# Patient Record
Sex: FEMALE | Race: WHITE
[De-identification: names, ages, dates, MRNs, and addresses within clinical notes are randomized per-mention and may not be internally consistent; named-entity substitution may affect disease eponyms.]

---

## 2020-03-29 ENCOUNTER — HOSPITAL ENCOUNTER (OUTPATIENT)
Dept: HOSPITAL 25 - ED | Age: 25
Setting detail: OBSERVATION
LOS: 1 days | Discharge: HOME | End: 2020-03-30
Attending: SURGERY | Admitting: HOSPITALIST
Payer: COMMERCIAL

## 2020-03-29 DIAGNOSIS — Z80.51: ICD-10-CM

## 2020-03-29 DIAGNOSIS — K21.9: ICD-10-CM

## 2020-03-29 DIAGNOSIS — R10.11: ICD-10-CM

## 2020-03-29 DIAGNOSIS — K80.00: Primary | ICD-10-CM

## 2020-03-29 DIAGNOSIS — R11.2: ICD-10-CM

## 2020-03-29 DIAGNOSIS — Z88.0: ICD-10-CM

## 2020-03-29 LAB
ALBUMIN SERPL BCG-MCNC: 4 G/DL (ref 3.2–5.2)
ALBUMIN/GLOB SERPL: 1.3 {RATIO} (ref 1–3)
ALP SERPL-CCNC: 61 U/L (ref 34–104)
ALT SERPL W P-5'-P-CCNC: 10 U/L (ref 7–52)
AMYLASE SERPL-CCNC: 32 U/L (ref 29–103)
ANION GAP SERPL CALC-SCNC: 5 MMOL/L (ref 2–11)
AST SERPL-CCNC: 11 U/L (ref 13–39)
BASOPHILS # BLD AUTO: 0 10^3/UL (ref 0–0.2)
BUN SERPL-MCNC: 8 MG/DL (ref 6–24)
BUN/CREAT SERPL: 11.4 (ref 8–20)
CALCIUM SERPL-MCNC: 8.8 MG/DL (ref 8.6–10.3)
CHLORIDE SERPL-SCNC: 105 MMOL/L (ref 101–111)
EOSINOPHIL # BLD AUTO: 0.1 10^3/UL (ref 0–0.6)
GLOBULIN SER CALC-MCNC: 3.1 G/DL (ref 2–4)
GLUCOSE SERPL-MCNC: 102 MG/DL (ref 70–100)
HCG SERPL QL: < 0.6 MIU/ML
HCO3 SERPL-SCNC: 26 MMOL/L (ref 22–32)
HCT VFR BLD AUTO: 40 % (ref 35–47)
HGB BLD-MCNC: 13.4 G/DL (ref 12–16)
INR PPP/BLD: 1.17 (ref 0.82–1.09)
LYMPHOCYTES # BLD AUTO: 1.1 10^3/UL (ref 1–4.8)
MCH RBC QN AUTO: 30 PG (ref 27–31)
MCHC RBC AUTO-ENTMCNC: 34 G/DL (ref 31–36)
MCV RBC AUTO: 88 FL (ref 80–97)
MONOCYTES # BLD AUTO: 0.7 10^3/UL (ref 0–0.8)
NEUTROPHILS # BLD AUTO: 6.5 10^3/UL (ref 1.5–7.7)
NRBC # BLD AUTO: 0 10^3/UL
NRBC BLD QL AUTO: 0
PLATELET # BLD AUTO: 225 10^3/UL (ref 150–450)
POTASSIUM SERPL-SCNC: 3.7 MMOL/L (ref 3.5–5)
PROT SERPL-MCNC: 7.1 G/DL (ref 6.4–8.9)
RBC # BLD AUTO: 4.51 10^6 /UL (ref 3.7–4.87)
RBC UR QL AUTO: (no result)
SODIUM SERPL-SCNC: 136 MMOL/L (ref 135–145)
WBC # BLD AUTO: 8.4 10^3/UL (ref 3.5–10.8)
WBC UR QL AUTO: (no result)

## 2020-03-29 PROCEDURE — 81025 URINE PREGNANCY TEST: CPT

## 2020-03-29 PROCEDURE — 83690 ASSAY OF LIPASE: CPT

## 2020-03-29 PROCEDURE — 83605 ASSAY OF LACTIC ACID: CPT

## 2020-03-29 PROCEDURE — 80053 COMPREHEN METABOLIC PANEL: CPT

## 2020-03-29 PROCEDURE — 36415 COLL VENOUS BLD VENIPUNCTURE: CPT

## 2020-03-29 PROCEDURE — 96365 THER/PROPH/DIAG IV INF INIT: CPT

## 2020-03-29 PROCEDURE — 96366 THER/PROPH/DIAG IV INF ADDON: CPT

## 2020-03-29 PROCEDURE — 81003 URINALYSIS AUTO W/O SCOPE: CPT

## 2020-03-29 PROCEDURE — G0378 HOSPITAL OBSERVATION PER HR: HCPCS

## 2020-03-29 PROCEDURE — 87086 URINE CULTURE/COLONY COUNT: CPT

## 2020-03-29 PROCEDURE — 81015 MICROSCOPIC EXAM OF URINE: CPT

## 2020-03-29 PROCEDURE — 88304 TISSUE EXAM BY PATHOLOGIST: CPT

## 2020-03-29 PROCEDURE — 96375 TX/PRO/DX INJ NEW DRUG ADDON: CPT

## 2020-03-29 PROCEDURE — 76705 ECHO EXAM OF ABDOMEN: CPT

## 2020-03-29 PROCEDURE — 96361 HYDRATE IV INFUSION ADD-ON: CPT

## 2020-03-29 PROCEDURE — 85025 COMPLETE CBC W/AUTO DIFF WBC: CPT

## 2020-03-29 PROCEDURE — 85610 PROTHROMBIN TIME: CPT

## 2020-03-29 PROCEDURE — 99284 EMERGENCY DEPT VISIT MOD MDM: CPT

## 2020-03-29 PROCEDURE — 86140 C-REACTIVE PROTEIN: CPT

## 2020-03-29 PROCEDURE — 82150 ASSAY OF AMYLASE: CPT

## 2020-03-29 PROCEDURE — 84702 CHORIONIC GONADOTROPIN TEST: CPT

## 2020-03-29 RX ADMIN — METRONIDAZOLE SCH MLS/HR: 5 INJECTION, SOLUTION INTRAVENOUS at 20:49

## 2020-03-29 RX ADMIN — CIPROFLOXACIN SCH MLS/HR: 2 INJECTION, SOLUTION INTRAVENOUS at 23:37

## 2020-03-29 RX ADMIN — METRONIDAZOLE SCH MLS/HR: 5 INJECTION, SOLUTION INTRAVENOUS at 13:26

## 2020-03-29 RX ADMIN — CIPROFLOXACIN SCH MLS/HR: 2 INJECTION, SOLUTION INTRAVENOUS at 12:01

## 2020-03-29 NOTE — ED
Abdominal Pain/Female





- HPI Summary


HPI Summary: 


Patient is a 24 year-old female presenting to [facility] with a chief complaint of 

RUQ pain initially onset 11 months ago and worsening over the last few days. 

She reports she began having gallbladder issues after her son was born almost a 

year ago. Over the course of her pain, she was diagnosed with anxiety attacks 

and GERD, which she has been placed on medications for without relief of her 

pain. After it was found that she has gallbladder issues, she has continued to 

have worsening attacks. She was initially scheduled to have a cholecystectomy 

on 3/24/2020, but the surgery was canceled due to the pandemic. On 3/27/2020, 

she went to [facility] and had an ultrasound but was discharged home. 

She previously had a CT. She returned to the hospital the following day and was 

admitted for pain control. She left AMA after feeling like she was mistreated, 

and there was no plan for surgery. This morning, she comes to the ED for 

worsening pain radiating into the back, nausea, vomiting, and decreased oral 

intake. She denies any fevers. Pain is rated 8/10 in severity. She has not 

taken any pain medications for treatment. LNMP: beginning of March 2020. 

Nonsmoker, rare EtOH, no substance use. Medications reviewed. Allergies noted.





Obtained medical records from [facility]: Abdominal U/S - Cholelithiasis. 





- History of Current Complaint


Chief Complaint: EDAbdPain


Stated Complaint: ABD/FLANK PAIN PER PT


Time Seen by Provider: 03/29/20 03:59


Hx Obtained From: Patient


Hx Last Menstrual Period: beginning of March 2020


Onset/Duration: Lasting Weeks, Still Present, Worse Since - last few days


Timing: Constant


Severity Initially: Mild


Severity Currently: Severe


Pain Intensity: 8


Pain Scale Used: 0-10 Numeric


Location: Discrete At: RUQ


Radiates: Yes


Radiates to: Back


Character: Sharp


Aggravating Factor(s): Food


Alleviating Factor(s): NPO


Associated Signs and Symptoms: Positive: Decreased Appetite, Nausea, Vomiting.  

Negative: Fever


Allergies/Adverse Reactions: 


 Allergies











Allergy/AdvReac Type Severity Reaction Status Date / Time


 


Penicillins Allergy  Unknown Verified 03/29/20 11:08





   Reaction  





   Details  











Home Medications: 


 Home Medications





Omeprazole 40 mg PO BID 03/29/20 [History Confirmed 03/29/20]


Xanax TAB* 0.25 mg PO TID PRN 03/29/20 [History Confirmed 03/29/20]











PMH/Surg Hx/FS Hx/Imm Hx


Endocrine/Hematology History: 


   Denies: Hx Diabetes


Respiratory History: 


   Denies: Hx Asthma


GI History: Reports: Hx Gall Bladder Disease, Hx Gastroesophageal Reflux Disease


Neurological History: Reports: Hx Migraine


Psychiatric History: Reports: Hx Anxiety





- Surgical History


Surgical History: None


Surgery Procedure, Year, and Place: none


Infectious Disease History: No


Infectious Disease History: 


   Denies: Traveled Outside the [place] in Last 30 Days





- Family History


Known Family History: Positive: Renal Disease - kidney cancer, Other - POsitive 

FMH of hypotension





- Social History


Alcohol Use: Rare


Hx Substance Use: No


Substance Use Type: Reports: None


Hx Tobacco Use: No


Smoking Status (MU): Never Smoked Tobacco





- Additional Comments


History Additional Comments: 


GERD, anxiety





Review of Systems





- ROS Summary


Review of Systems Summary: 


 Home Medications











 Medication  Instructions  Recorded  Confirmed  Type


 


Ibuprofen TAB* [Motrin TAB* 800 MG] 800 mg PO Q8H PRN #30 tab 12/12/16  Rx


 


Otc Sinus Tab 2 tab PO ONCE PRN 12/12/16 12/12/16 History








Negative: Fever


Positive: Abdominal Pain - RUQ, Vomiting, Nausea, Other - decreased oral intake


All Other Systems Reviewed And Are Negative: Yes





Physical Exam





- Summary


Physical Exam Summary: 


General: Well-developed, Well-nourished female. Mildly anxious appearing. No 

acute distress.


HEENT: Normocephalic, Atraumatic. 


              Eyes: Conjuctiva normal, PERRL.


              Oropharynx: Clear, mucous membranes moist, (-) exudates. 


Neck: Soft, FROM, (-) lymphadenopathy, (-) thyromegaly, (-) JVD.


Cardiovascular: Normal sinus rhythm, (-) murmur.


Lungs: Clear to auscultation bilaterally (-) wheezes, (-) rales, (-) rhonchi.


Abdomen: Soft, mild RUQ and right lateral tenderness, non-distended, (-) 

organomegaly, normal bowel sounds.


Back: (-) CVA tenderness


Extremities: No edema.


Skin: Warm, dry, (-) rash.


Neuro: Alert and oriented x3, moves all extremities equally. No ataxia. No gait 

disturbance. No sensory deficit. Normal strength, normal sensation. 


Psychiatric: Mood normal, affect normal.


Triage Information Reviewed: Yes


Vital Signs On Initial Exam: 


 Initial Vitals











Temp Pulse Resp BP Pulse Ox


 


 98.7 F   83   16   103/62   97 


 


 03/29/20 03:45  03/29/20 03:45  03/29/20 03:45  03/29/20 03:45  03/29/20 03:45











Vital Signs Reviewed: Yes





Procedures





- Sedation


Patient Received Moderate/Deep Sedation with Procedure: No





Diagnostics





- Vital Signs


 Vital Signs











  Temp Pulse Resp BP Pulse Ox


 


 03/29/20 03:45  98.7 F  83  16  103/62  97














- Laboratory


Result Diagrams: 


 03/29/20 04:11





 03/29/20 04:11


Lab Statement: Any lab studies that have been ordered have been reviewed, and 

results considered in the medical decision making process.





Re-Evaluation





- Re-Evaluation


  ** First Eval


Re-Evaluation Time: 07:35


Comment: Patient is resting comfortably. Ultrasound being done. Patient is 

hemodyamically stable.





Abdominal Pain Fem Course/Dx





- Course


Course Of Treatment: 24-year-old female presents from home with abdominal pain.

  Patient states over the last 11 months since she had her son that she has 

been experiencing gallbladder attacks.  She states she's been seen at [facility] 

numerous times.  Has had surgery set up for March 23 but it had to be canceled 

because of elective surgeries were put on hold at this time due to Covid 

pandemic.  Patient states every time she gets an attack it is worse.  She went 

to [facility] Friday morning and then again Saturday morning.  She was 

hospitalized at that time but states all they did was put on a clear liquid 

diet and give her every day pain medication.  She states her pain is unbearable 

and she is not able to take it anymore.  At home she is taking Tylenol 

ibuprofen without relief.  Also having nausea and vomiting.  No fevers or 

chills.  No diarrhea.  On physical exam she is afebrile with right upper and 

right lateral abdominal tenderness.  Laboratories have no significant 

abnormalities.  Patient was ordered for a CT abdomen and pelvis.  However she 

states that she had 2 negative CTs done already for this same problem.  Patient 

is signed out at change of shift awaiting ultrasound.  Patient received fluids, 

Zofran, and Toradol in the ED.





- Diagnoses


Provider Diagnoses: 


 Abdominal pain, RUQ, Cholecystitis








Discharge ED





- Sign-Out/Discharge


Documenting (check all that apply): Sign-Out Patient


Signing out patient TO: David Bunch - Patient is a sign-out to Dr. David Bunch MD, at change of shift at 0700 on 3/29/20, pending Abdominal US and 

disposition.





- Discharge Plan


Condition: Stable


Disposition: ADMITTED TO [facility]





- Billing Disposition and Condition


Condition: STABLE


Disposition: Admitted to [facility]





- Attestation Statements


Document Initiated by Scribe: Yes


Documenting Scribe: Melani Salas


Provider For Whom Sunny is Documenting (Include Credential): Lily Pritchard MD


Scribe Attestation: 


IMelani, scribed for Lily Pritchard MD on 03/29/20 at 2006. 


Scribe Documentation Reviewed: Yes


Provider Attestation: 


The documentation as recorded by the Melani moreno accurately reflects 

the service I personally performed and the decisions made by me, Lily Pritchard MD


Status of Scribe Document: Viewed

## 2020-03-29 NOTE — HP
H&P (Free Text)


History and Physical: 





DATE OF ADMISSION: 3/29/20





REASON FOR ADMISSION: Acute cholecystitis





PCP: Madison Beckford NP





HPI:


Eve Thompson is a 24 year-old otherwise healthy woman who presents to the 

ED with abdominal pain. She has been having episodes of right upper quadrant 

abdominal pain for the past 10 months. There are no inciting factors, but she 

did feel the pain was less severe when she switched to a bland diet. She has 

been following a bland diet for about 7 months. She is asymptomatic between 

episodes, and the pain previously was alleviated with a heating pad. Her 

abdominal and chest pain were thought to be due to anxiety and GERD. She had 

been prescribed Xanax and omeprazole which do not help.


This current episode started on Friday 3/27. She has had RUQ abdominal pain 

radiating to her back and up into her chest. There is associated nausea and 

vomiting. She has had difficulty keeping down liquids due to pain. She thinks 

her symptoms might be slightly better compared to Friday. She denies fevers. 

She reports shortness of breath due to pain.  


The patient was scheduled for lap cholecystectomy in [place] last week, but 

surgery was cancelled due to the pandemic. She presented to [facility] 

yesterday. She was admitted but did not feel her symptoms were controlled and 

was not happy with her care. She came to [place] early this morning because the 

pain was not controlled with hydrocodone at home.





PMH:


None





PSH:


None





 Home Medications











 Medication  Instructions  Recorded  Confirmed  Type


 


Omeprazole 40 mg PO BID 03/29/20 03/29/20 History


 


Xanax TAB* 0.25 mg PO TID PRN 03/29/20 03/29/20 History








Allergies





Penicillins Allergy (Verified 03/29/20 11:08)


 Unknown Reaction Details


 PT'S FATHER HAS STRONG ALLERGIC REACTION TO PCN HX 





FH:


Both parents are healthy. No history of bleeding disorders in the family. She 

had a grandmother with kidney cancer.





SH:


Patient lives with her mother, grandfather, and infant son. She works as a 

medical assistant and CNA. She denies tobacco, alcohol, or recreational drug 

use.





ROS:


10-point review of systems was obtained. Pertinent positives and negatives are 

in HPI.





PHYSICAL EXAM:


 











Temp Pulse Resp BP Pulse Ox


 


 98.7 F   75   16   110/62   98 


 


 03/29/20 03:45  03/29/20 11:00  03/29/20 03:45  03/29/20 10:38  03/29/20 11:00








General: NAD, lying on stretcher.


Head: Normocephalic and atraumatic


Eyes: Pupils equal and no scleral icterus.


Mouth: Moist mucous membranes


Neck: Supple, trachea midline


CV: RRR


Respiratory: CTA, no accessory muscle use on room air


Abdomen: Soft, nondistended, tenderness to palpation RUQ and RLQ. No guarding 

or rebound.


Extremities: Warm, no pedal edema


Skin: Warm and dry. Intact.


Neuro: Alert and oriented x3. Moves all extremities equally.


Psych: Normal affect.





 Laboratory Results - last 24 hr











  03/29/20 03/29/20 03/29/20





  04:11 04:11 04:11


 


WBC  8.4  


 


RBC  4.51  


 


Hgb  13.4  


 


Hct  40  


 


MCV  88  


 


MCH  30  


 


MCHC  34  


 


RDW  13  


 


Plt Count  225  


 


MPV  7.7  


 


Neut % (Auto)  76.7  


 


Lymph % (Auto)  12.6  


 


Mono % (Auto)  8.9  


 


Eos % (Auto)  1.6  


 


Baso % (Auto)  0.2  


 


Absolute Neuts (auto)  6.5  


 


Absolute Lymphs (auto)  1.1  


 


Absolute Monos (auto)  0.7  


 


Absolute Eos (auto)  0.1  


 


Absolute Basos (auto)  0.0  


 


Absolute Nucleated RBC  0.0  


 


Nucleated RBC %  0.0  


 


INR (Anticoag Therapy)   1.17 H 


 


Sodium    136


 


Potassium    3.7


 


Chloride    105


 


Carbon Dioxide    26


 


Anion Gap    5


 


BUN    8


 


Creatinine    0.70


 


Est GFR ( Amer)    124.4


 


Est GFR (Non-Af Amer)    102.8


 


BUN/Creatinine Ratio    11.4


 


Glucose    102 H


 


Lactic Acid   


 


Calcium    8.8


 


Total Bilirubin    0.60


 


AST    11 L


 


ALT    10


 


Alkaline Phosphatase    61


 


C-Reactive Protein    63.33 H


 


Total Protein    7.1


 


Albumin    4.0


 


Globulin    3.1


 


Albumin/Globulin Ratio    1.3


 


Amylase    32


 


Lipase    10 L


 


Beta HCG, Quant    < 0.60














  03/29/20





  04:11


 


WBC 


 


RBC 


 


Hgb 


 


Hct 


 


MCV 


 


MCH 


 


MCHC 


 


RDW 


 


Plt Count 


 


MPV 


 


Neut % (Auto) 


 


Lymph % (Auto) 


 


Mono % (Auto) 


 


Eos % (Auto) 


 


Baso % (Auto) 


 


Absolute Neuts (auto) 


 


Absolute Lymphs (auto) 


 


Absolute Monos (auto) 


 


Absolute Eos (auto) 


 


Absolute Basos (auto) 


 


Absolute Nucleated RBC 


 


Nucleated RBC % 


 


INR (Anticoag Therapy) 


 


Sodium 


 


Potassium 


 


Chloride 


 


Carbon Dioxide 


 


Anion Gap 


 


BUN 


 


Creatinine 


 


Est GFR ( Amer) 


 


Est GFR (Non-Af Amer) 


 


BUN/Creatinine Ratio 


 


Glucose 


 


Lactic Acid  0.6


 


Calcium 


 


Total Bilirubin 


 


AST 


 


ALT 


 


Alkaline Phosphatase 


 


C-Reactive Protein 


 


Total Protein 


 


Albumin 


 


Globulin 


 


Albumin/Globulin Ratio 


 


Amylase 


 


Lipase 


 


Beta HCG, Quant 








Abd U/S- Gallstones, small amount of pericholecystic fluid, wall is 5.4 mm. CBD 

4 mm.





IMPRESSION:


24F with acute cholecystitis. Gallbladder wall is thickened on ultrasound, and 

CRP is elevated. However, she does not have an elevated white count or fever. 

Differential includes symptomatic cholelithiasis or biliary colic. It appears 

that she has not been able to control the symptoms at home despite diet 

modification and narcotic pain medication. 


-Admit for observation


-Start cipro and flagyl to treat for cholecystitis.


-Plan for laparoscopic cholecystectomy tomorrow as add on. I explained to the 

patient that Dr Rice may be the surgeon depending on scheduling.


-Clear liquids today, NPO and IVF at midnight.


-Tylenol, percocet, morphine prn for pain.


-DVT ppx: SCDs, activity as tolerated

## 2020-03-29 NOTE — ED
Progress





- Progress Note


Progress Note: 





Patient received as a sign out from Dr. Pritchard at 0700 shift change pending abd/

pel US and dispo.





- Results/Orders


Results/Orders: 





ABDOMEN US IMPRESSION: Cholelithiasis with mild gallbladder wall thickening. 

Small amount of


pericholecystic fluid is noted. The gallbladder is not abnormally distended 

however. This report was reviewed by the ED physician.





Re-Evaluation





- Re-Evaluation


  ** First Eval


Re-Evaluation Time: 07:35


Comment: Patient is resting comfortably. Ultrasound being done. Patient is 

hemodyamically stable.





Course/Dx





- Course


Course Of Treatment: This patient was signed out to Dr. Pritchard at shift change.

  She recommends to follow up the right upper quadrant ultrasound to rule out 

cholecystitis.  RUQ U/S IMPRESSION: Cholelithiasis with mild gallbladder wall 

thickening. Small amount of pericholecystic fluid is noted. The gallbladder is 

not abnormally distended.  Patient seen and examined with Dr. Chambers from surgery 

and she accepted the patient for admission for an acute cholecystitis.  Patient 

is hemodynamically stable, alert and oriented 3.





- Diagnoses


Provider Diagnoses: 


 Abdominal pain, RUQ, Cholecystitis








- Provider Notifications


Discussed Care Of Patient With: Preeti Chambers


Time Discussed With Above Provider: 09:11


Instructed by Provider To: Other - Patient's case was discussed with Dr. Chambers, 

who will see the patient in the ER. After seeing the patient at 1040, Dr. Chambers 

recommended admission for cholecystitis. At 1141 Dr. Radha Hutchinson and Dr. Gates 

were in the ER to see the patient and at 1151 confirmed that they will admit 

the patient.





Discharge ED





- Sign-Out/Discharge


Documenting (check all that apply): Patient Departure - admit





- Discharge Plan


Condition: Stable


Disposition: ADMITTED TO [facility]





- Billing Disposition and Condition


Condition: STABLE


Disposition: Admitted to [facility]





- Attestation Statements


Document Initiated by Scribe: Yes


Documenting Scribe: Fabián Flores


Provider For Whom Sunny is Documenting (Include Credential): David Bunch MD


Scribe Attestation: 


Fabián GALINDO, scribed for David Bunch MD on 03/29/20 at 1844. 


Scribe Documentation Reviewed: Yes


Provider Attestation: 


The documentation as recorded by the Fabián moreno accurately 

reflects the service I personally performed and the decisions made by me, 

David Bunch MD


Status of Sunny Document: Viewed

## 2020-03-30 VITALS — SYSTOLIC BLOOD PRESSURE: 118 MMHG | DIASTOLIC BLOOD PRESSURE: 73 MMHG

## 2020-03-30 RX ADMIN — SODIUM CHLORIDE, SODIUM LACTATE, POTASSIUM CHLORIDE, AND CALCIUM CHLORIDE SCH MLS/HR: 600; 310; 30; 20 INJECTION, SOLUTION INTRAVENOUS at 00:00

## 2020-03-30 RX ADMIN — CIPROFLOXACIN SCH MLS/HR: 2 INJECTION, SOLUTION INTRAVENOUS at 11:04

## 2020-03-30 RX ADMIN — METRONIDAZOLE SCH MLS/HR: 5 INJECTION, SOLUTION INTRAVENOUS at 04:41

## 2020-03-30 RX ADMIN — SODIUM CHLORIDE, SODIUM LACTATE, POTASSIUM CHLORIDE, AND CALCIUM CHLORIDE SCH MLS/HR: 600; 310; 30; 20 INJECTION, SOLUTION INTRAVENOUS at 11:03

## 2020-03-30 NOTE — PN
Progress Note





- Progress Note


Date of Service: 03/30/20


SOAP: 


Subjective:





Still with some RUQ abd pain


No N/V








Objective:


 











Temp Pulse Resp BP Pulse Ox


 


 98.2 F   72   20   99/59   100 


 


 03/30/20 11:09  03/30/20 11:09  03/30/20 11:09  03/30/20 11:09  03/30/20 11:09








 Intake & Output











 03/28/20 03/29/20 03/30/20 03/31/20





 06:59 06:59 06:59 06:59


 


Intake Total  1000 1394 980


 


Output Total   1000 675


 


Balance  1000 394 305


 


Weight  140 lb 126 lb 6.4 oz 


 


Intake:    


 


  IV Fluids  1000 215 980


 


    LR    980


 


    NS (0.9%)   15 


 


  IVPB   459 


 


    ABX - CIPROFLOXACIN   200 


 


    ABX - FLAGYL   100 


 


    ABX - LEVOFLOXACIN   159 


 


  Oral   720 


 


Output:    


 


  Urine   1000 675


 


Other:    


 


  Estimated Void   Medium 


 


  # Voids   1 





 





PEX:


Comfortable


Lungs are clear


Abd is soft and non-distended. Tenderness RUQ without peritoneal irritation. 

NABS





Labs and US reviewed











Assessment:


Acute calculous cholecystitis








Plan:





Laparoscopic cholecystectomy today. I have reviewed her history, labs and US 

and discussed treatment with her. The risks of, but not limited to, of bleeding

, infection, abscess, open procedure, injury to peritoneal and retroperitoneal 

organs, common bile duct injury, sepsis, GA and blood clots were discussed.





She gives her consent to proceed.

## 2020-03-30 NOTE — BRIEFOPN
Brief Operative/Procedure Note





- Operation Details


Pre-Op Diagnosis: Acute cholecystitis


Post-Op Diagnosis: Acute cholecystitis


Procedures: Laparoscopic cholecystectomy


Surgeon(s)/Proceduralists: Dr. Rice.  Assist: GULSHAN Swanson


Anesthesia: GETA


Estimated Blood Loss: <25cc


Findings: As above


Specimen(s)/Culture(s) Description: Gallbladder


Complications: None

## 2020-04-01 NOTE — DS
Admit date: 3/29/2020


Discharge date: 3/30/2020


Admit diagnosis: Acute cholecystitis


Discharge diagnosis: Acute cholecystitis





Procedure performed: Laparoscopic cholecystectomy





PEX


General: Alert, in NAD.


Integumentary: No rashes, jaundice, petechia.


HEENT: Oropharynx clear. PERRLA.


Heart: RRR, no MRG.


Lungs: CTAB, no WRR.


ABD: Soft, non distended. Incisions with minimal tenderness, C/D/I. No guarding 

or rebound tenderness.


Extremities: No edema. Distal pulses intact bilaterally. Calves soft and 

nontender.





Labs: Unremarkable.





Hospital course: Presented to ED for abdominal pain with associated nasuea and 

vomiting. She was previously diagnosed with cholecystitis and had been 

scheduled for a lap cholecystectomy in [place], which ended up being cancelled 

due to the pandemic. She was admitted for to [facility] for observation. The following 

day, she underwent a laparoscopic cholecystectomy which was tolerated well. 

After recovering from general anesthesia appropriately, she was discharged from 

the PACU. 





Instructions were given to the patient regarding diet, meds, activity, and 

follow up. All questions were answered. 





Discharged home in stable condition.